# Patient Record
Sex: FEMALE | URBAN - METROPOLITAN AREA
[De-identification: names, ages, dates, MRNs, and addresses within clinical notes are randomized per-mention and may not be internally consistent; named-entity substitution may affect disease eponyms.]

---

## 2022-01-05 ENCOUNTER — TELEPHONE (OUTPATIENT)
Dept: FAMILY PLANNING/WOMEN'S HEALTH CLINIC | Age: 27
End: 2022-01-05

## 2022-01-05 NOTE — TELEPHONE ENCOUNTER
[facility] received a call from the patient and is not qualified for [employer].      Service needed:Screening mammogram    What resources were given: [address]    Reason for not qualifying: Income - patient is  with income of $90,000

## 2022-01-13 NOTE — TELEPHONE ENCOUNTER
Noted, message sent to [facility] to review that screening mammograms are not usually done on pts younger than 36years of age unless a strong family hx of breast cancer.  Anastasia Morton RN